# Patient Record
Sex: MALE | Race: WHITE | Employment: STUDENT | URBAN - METROPOLITAN AREA
[De-identification: names, ages, dates, MRNs, and addresses within clinical notes are randomized per-mention and may not be internally consistent; named-entity substitution may affect disease eponyms.]

---
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
## 2022-01-10 ENCOUNTER — HOSPITAL ENCOUNTER (EMERGENCY)
Age: 22
Discharge: HOME | End: 2022-01-10
Attending: STUDENT IN AN ORGANIZED HEALTH CARE EDUCATION/TRAINING PROGRAM
Payer: COMMERCIAL

## 2022-01-10 VITALS
RESPIRATION RATE: 16 BRPM | HEART RATE: 90 BPM | TEMPERATURE: 97.9 F | WEIGHT: 173.5 LBS | OXYGEN SATURATION: 100 % | DIASTOLIC BLOOD PRESSURE: 75 MMHG | BODY MASS INDEX: 24.89 KG/M2 | SYSTOLIC BLOOD PRESSURE: 142 MMHG

## 2022-01-10 DIAGNOSIS — S61.211A LACERATION OF LEFT INDEX FINGER W/O FOREIGN BODY W/O DAMAGE TO NAIL, INITIAL ENCOUNTER: Primary | ICD-10-CM

## 2022-01-10 PROCEDURE — 75810000293 HC SIMP/SUPERF WND  RPR

## 2022-01-10 PROCEDURE — 90715 TDAP VACCINE 7 YRS/> IM: CPT | Performed by: NURSE PRACTITIONER

## 2022-01-10 PROCEDURE — 74011000250 HC RX REV CODE- 250: Performed by: NURSE PRACTITIONER

## 2022-01-10 PROCEDURE — 74011000250 HC RX REV CODE- 250: Performed by: STUDENT IN AN ORGANIZED HEALTH CARE EDUCATION/TRAINING PROGRAM

## 2022-01-10 PROCEDURE — 90471 IMMUNIZATION ADMIN: CPT

## 2022-01-10 PROCEDURE — 99283 EMERGENCY DEPT VISIT LOW MDM: CPT

## 2022-01-10 PROCEDURE — 74011250636 HC RX REV CODE- 250/636: Performed by: NURSE PRACTITIONER

## 2022-01-10 RX ORDER — CEPHALEXIN 500 MG/1
500 CAPSULE ORAL 3 TIMES DAILY
Qty: 21 CAPSULE | Refills: 0 | Status: SHIPPED | OUTPATIENT
Start: 2022-01-10 | End: 2022-01-17

## 2022-01-10 RX ORDER — BACITRACIN 500 UNIT/G
1 PACKET (EA) TOPICAL
Status: COMPLETED | OUTPATIENT
Start: 2022-01-10 | End: 2022-01-10

## 2022-01-10 RX ORDER — LIDOCAINE HYDROCHLORIDE 10 MG/ML
5 INJECTION INFILTRATION; PERINEURAL ONCE
Status: DISCONTINUED | OUTPATIENT
Start: 2022-01-10 | End: 2022-01-10 | Stop reason: HOSPADM

## 2022-01-10 RX ADMIN — TETANUS TOXOID, REDUCED DIPHTHERIA TOXOID AND ACELLULAR PERTUSSIS VACCINE, ADSORBED 0.5 ML: 5; 2.5; 8; 8; 2.5 SUSPENSION INTRAMUSCULAR at 16:35

## 2022-01-10 RX ADMIN — Medication 2 ML: at 16:10

## 2022-01-10 RX ADMIN — BACITRACIN 1 PACKET: 500 OINTMENT TOPICAL at 16:39

## 2022-01-10 NOTE — ED NOTES
Patient awake, alert, and in no distress. Discharge instructions and education given to patient and father. Verbalized understanding of discharge instructions. Patient walked out of ED with father. Michael Henning

## 2022-01-10 NOTE — DISCHARGE INSTRUCTIONS
Keep finger clean and dry for 24 hours, then may get wet but make sure to pat dry and put antibiotic ointment on wound 2 times a day  Take antibiotics as prescribed  Follow up with student health or someone who can take stitches out in 2 weeks  Return for any signs or symptoms of infections or other concerns

## 2022-01-10 NOTE — ED NOTES
Pt resting quietly on the stretcher, no labored breathing or distress noted, skin warm and dry with 1\" laceration to distal portion of left 2nd finger, still bleeding, bleeding controlled with tourniquet, LET applied with education, will remove tourniquet in about 10 minutes after placement, pt and parents educated on this and verbalized understanding, additional slight laceration that is more of skin flap is noted to dorsal side of left thumb, bleeding controlled but when pressure applied it was noted to ooze slightly, provider aware

## 2022-01-10 NOTE — ED PROVIDER NOTES
This is a 61-year-old male with a left index finger laceration. He had just bought a new hatchet to cut wood with an sharpened it at home. He was cutting wood and hit his left index finger with a hatchet. He denies any numbness tingling or altered sensation. No decreased range of motion. He has been putting pressure on it to help stop the bleeding. Last tetanus was in 2013. No other injuries or concerns at this time. Past medical history: None  Social: Vaccines up-to-date; [employer] student    The history is provided by the patient. Laceration         No past medical history on file. Past Surgical History:   Procedure Laterality Date    HX WISDOM TEETH EXTRACTION  08/2020         Family History:   Problem Relation Age of Onset    Hypertension Father     High Cholesterol Father     High Cholesterol Paternal Grandfather     Hypertension Paternal Grandfather     Heart Surgery Other        Social History     Socioeconomic History    Marital status: SINGLE     Spouse name: Not on file    Number of children: Not on file    Years of education: Not on file    Highest education level: Not on file   Occupational History    Not on file   Tobacco Use    Smoking status: Never Smoker    Smokeless tobacco: Never Used   Substance and Sexual Activity    Alcohol use: Yes    Drug use: Never    Sexual activity: Not on file   Other Topics Concern    Not on file   Social History Narrative    Not on file     Social Determinants of Health     Financial Resource Strain:     Difficulty of Paying Living Expenses: Not on file   Food Insecurity:     Worried About Running Out of Food in the Last Year: Not on file    Neetu of Food in the Last Year: Not on file   Transportation Needs:     Lack of Transportation (Medical): Not on file    Lack of Transportation (Non-Medical):  Not on file   Physical Activity:     Days of Exercise per Week: Not on file    Minutes of Exercise per Session: Not on file   Stress:     Feeling of Stress : Not on file   Social Connections:     Frequency of Communication with Friends and Family: Not on file    Frequency of Social Gatherings with Friends and Family: Not on file    Attends Zoroastrian Services: Not on file    Active Member of Clubs or Organizations: Not on file    Attends Club or Organization Meetings: Not on file    Marital Status: Not on file   Intimate Partner Violence:     Fear of Current or Ex-Partner: Not on file    Emotionally Abused: Not on file    Physically Abused: Not on file    Sexually Abused: Not on file   Housing Stability:     Unable to Pay for Housing in the Last Year: Not on file    Number of [place] in the Last Year: Not on file    Unstable Housing in the Last Year: Not on file         ALLERGIES: Patient has no known allergies. Review of Systems   Constitutional: Negative. Musculoskeletal:        Left index finger laceration   Skin: Positive for wound. All other systems reviewed and are negative. Vitals:    01/10/22 1546 01/10/22 1555   BP:  (!) 142/75   Pulse:  90   Resp:  16   Temp:  97.9 °F (36.6 °C)   SpO2:  100%   Weight: 78.7 kg (173 lb 8 oz)             Physical Exam  Vitals and nursing note reviewed. Constitutional:       Appearance: Normal appearance. Musculoskeletal:         General: Signs of injury present. Normal range of motion. Left hand: Laceration present. Comments: Left index finger distal third there is a 1.5 cm laceration that is actively bleeding; used tourniquet to stop bleeding, does not appear deep, no tendon involvement, no bony abnormality; Normal rom of left index finger with intact sensation; skin pink and well perfused; no nailbed injury. Skin:     General: Skin is warm. Capillary Refill: Capillary refill takes less than 2 seconds. Neurological:      General: No focal deficit present. Mental Status: He is alert.    Psychiatric:         Mood and Affect: Mood normal. MDM  Number of Diagnoses or Management Options  Laceration of left index finger w/o foreign body w/o damage to nail, initial encounter  Diagnosis management comments: 26 y/o male with a left index finger laceration from a hatchet while cutting wood; last dtap 2013 so will need dtap today and suture repair; dc home on keflex for 7 days. Amount and/or Complexity of Data Reviewed  Obtain history from someone other than the patient: yes  Discuss the patient with other providers: yes    Risk of Complications, Morbidity, and/or Mortality  Presenting problems: moderate  Diagnostic procedures: moderate  Management options: moderate    Patient Progress  Patient progress: stable         Wound Repair    Date/Time: 1/10/2022 5:06 PM  Performed by: NPPreparation: skin prepped with Shur-Clens and sterile field established  Location details: left index finger  Wound length:2.5 cm or less    Anesthesia:  Local Anesthetic: LET (lido, epi, tetracaine)  Foreign bodies: no foreign bodies  Irrigation solution: saline  Irrigation method: jet lavage  Debridement: none  Skin closure: 4-0 nylon  Number of sutures: 6  Technique: simple and interrupted  Approximation: close  Dressing: antibiotic ointment, non-adhesive packing strip and pressure dressing  Patient tolerance: patient tolerated the procedure well with no immediate complications  My total time at bedside, performing this procedure was 16-30 minutes.

## 2023-05-15 RX ORDER — ATENOLOL 25 MG/1
TABLET ORAL
COMMUNITY
Start: 2021-01-12

## 2023-05-15 RX ORDER — IBUPROFEN 200 MG
TABLET ORAL
COMMUNITY

## 2023-05-15 RX ORDER — CLINDAMYCIN PHOSPHATE 10 MG/G
1 GEL TOPICAL 2 TIMES DAILY
COMMUNITY